# Patient Record
Sex: FEMALE | Race: WHITE | Employment: PART TIME | URBAN - METROPOLITAN AREA
[De-identification: names, ages, dates, MRNs, and addresses within clinical notes are randomized per-mention and may not be internally consistent; named-entity substitution may affect disease eponyms.]

---

## 2020-08-04 ENCOUNTER — HOSPITAL ENCOUNTER (OUTPATIENT)
Dept: GENERAL RADIOLOGY | Age: 20
Discharge: HOME | End: 2020-08-04
Payer: COMMERCIAL

## 2020-08-04 PROCEDURE — 71046 X-RAY EXAM CHEST 2 VIEWS: CPT

## 2021-05-19 ENCOUNTER — HOSPITAL ENCOUNTER (EMERGENCY)
Age: 21
Discharge: HOME | End: 2021-05-19
Attending: EMERGENCY MEDICINE
Payer: COMMERCIAL

## 2021-05-19 ENCOUNTER — APPOINTMENT (OUTPATIENT)
Dept: GENERAL RADIOLOGY | Age: 21
End: 2021-05-19
Payer: COMMERCIAL

## 2021-05-19 VITALS
HEIGHT: 67 IN | BODY MASS INDEX: 21.19 KG/M2 | RESPIRATION RATE: 16 BRPM | TEMPERATURE: 97.8 F | OXYGEN SATURATION: 100 % | WEIGHT: 135 LBS | HEART RATE: 69 BPM | DIASTOLIC BLOOD PRESSURE: 72 MMHG | SYSTOLIC BLOOD PRESSURE: 114 MMHG

## 2021-05-19 DIAGNOSIS — R06.02 SHORTNESS OF BREATH: Primary | ICD-10-CM

## 2021-05-19 PROCEDURE — 99283 EMERGENCY DEPT VISIT LOW MDM: CPT

## 2021-05-19 PROCEDURE — 71045 X-RAY EXAM CHEST 1 VIEW: CPT

## 2021-05-19 PROCEDURE — 93005 ELECTROCARDIOGRAM TRACING: CPT | Performed by: NURSE PRACTITIONER

## 2021-05-19 RX ORDER — IBUPROFEN 600 MG/1
600 TABLET ORAL EVERY 6 HOURS PRN
COMMUNITY

## 2021-05-19 RX ORDER — ACETAMINOPHEN 325 MG/1
650 TABLET ORAL EVERY 6 HOURS PRN
COMMUNITY

## 2021-05-19 RX ORDER — BETAMETHASONE DIPROPIONATE 0.05 %
OINTMENT (GRAM) TOPICAL 2 TIMES DAILY
COMMUNITY
Start: 2021-02-04

## 2021-05-19 RX ORDER — CETIRIZINE HYDROCHLORIDE 10 MG/1
10 TABLET ORAL DAILY
COMMUNITY
Start: 2020-09-03

## 2021-05-19 ASSESSMENT — ENCOUNTER SYMPTOMS
NAUSEA: 0
VOMITING: 0
SHORTNESS OF BREATH: 1
ABDOMINAL PAIN: 0
COUGH: 0
CHEST TIGHTNESS: 0

## 2021-05-19 ASSESSMENT — PAIN SCALES - GENERAL: PAINLEVEL_OUTOF10: 4

## 2021-05-19 ASSESSMENT — PAIN DESCRIPTION - PAIN TYPE: TYPE: ACUTE PAIN

## 2021-05-19 ASSESSMENT — PAIN DESCRIPTION - DESCRIPTORS: DESCRIPTORS: SHOOTING

## 2021-05-19 NOTE — ED PROVIDER NOTES
[address] [facility] ENCOUNTER          NURSE PRACTITIONER NOTE       Date of evaluation: 5/19/2021    Chief Complaint     Shortness of Breath      History of Present Illness     David Aguilera is a 24 y.o. female with a past medical history of anxiety and ongoing shortness of breath; who presents to the emergency department with a complaint of worsening of her shortness of breath today while she was sitting in her kitchen. Patient states that she was scrolling through her phone looking for recipes when she felt that her baseline shortness of breath got worse and that she \"cannot fill her lungs with air. \"  She feels like her lungs are \"restricted\" and are not feeling completely. Patient also relates an hour of intermittent sharp shooting chest pain, no chest pain currently. No radiation of the pain. Denies pleuritic component to the pain. Did recently travel via car to [place] (4-hour drive, with 1 stop). She has been evaluated by a pulmonologist in the past, and had reassuring CT scan of her chest in December 2020, as well as unremarkable PFTs. She states that this shortness of breath has been ongoing since May of last year. Getting over a URI/sinus infection for which she was treated with doxycycline, continues to have some postnasal drip with this. Otherwise denies acute complaints, denies cough, fevers chills or sweats. LMP one week ago. Review of Systems     Review of Systems   Constitutional: Negative. HENT: Positive for congestion. Respiratory: Positive for shortness of breath. Negative for cough and chest tightness. Cardiovascular: Positive for chest pain (intermittent, none currently). Negative for leg swelling. Gastrointestinal: Negative for abdominal pain, nausea and vomiting. Musculoskeletal: Negative. Skin: Negative. Allergic/Immunologic: Negative for immunocompromised state. Neurological: Negative.     Hematological: Does not bruise/bleed easily. Psychiatric/Behavioral: Negative. Past Medical, Surgical, Family, and Social History     She has a past medical history of Anxiety. She has no past surgical history on file. Her family history is not on file. She reports that she has never smoked. She has never used smokeless tobacco. She reports current alcohol use. She reports that she does not use drugs. Medications     Current Discharge Medication List      CONTINUE these medications which have NOT CHANGED    Details   acetaminophen (TYLENOL) 325 MG tablet Take 650 mg by mouth every 6 hours as needed for Pain      ibuprofen (ADVIL;MOTRIN) 600 MG tablet Take 600 mg by mouth every 6 hours as needed for Pain      cetirizine (ZYRTEC) 10 MG tablet Take 10 mg by mouth daily      betamethasone dipropionate (DIPROLENE) 0.05 % ointment Apply topically 2 times daily      NAPROXEN PO Take by mouth as needed             Allergies     She is allergic to augmentin [amoxicillin-pot clavulanate] and azithromycin. Physical Exam     INITIAL VITALS: BP: 127/82, Temp: 97.8 °F (36.6 °C), Pulse: 82, Resp: 18, SpO2: 100 %   Physical Exam  Vitals and nursing note reviewed. Constitutional:       Appearance: Normal appearance. She is well-developed and normal weight. Cardiovascular:      Rate and Rhythm: Normal rate and regular rhythm. Pulses: Normal pulses. Heart sounds: Normal heart sounds. No murmur heard. Comments: No extremity edema  Pulmonary:      Effort: Pulmonary effort is normal. No tachypnea, accessory muscle usage or respiratory distress. Breath sounds: Normal breath sounds. Skin:     General: Skin is warm and dry. Neurological:      Mental Status: She is alert and oriented to person, place, and time.    Psychiatric:         Mood and Affect: Mood normal.         Behavior: Behavior normal.           Diagnostic Results     EKG   Interpreted in conjunction with emergency department physician No att. providers found  Rhythm: outlined in the AVS. Patient was agreeable and understanding to this plan of care. This patient was also evaluated by the attending physician. All care plans were discussed and agreed upon. Clinical Impression     1.  Shortness of breath        Disposition     PATIENT REFERRED TO:  [facility] [address] [place] [address] [id number] [phone]      call for follow up appointment    Magdalena Brown MD  [address] [place] [address]  [phone]      call for follow up appointment      DISCHARGE MEDICATIONS:  Current Discharge Medication List          DISPOSITION  Home in stable condition        JACOB Cao - CNP  05/19/21 3663

## 2021-05-19 NOTE — ED TRIAGE NOTES
Patient reports to the ED with shortness of breath/difficulty taking a full breath x2 hours today. States she gets this often and has seen a pulmonologist that found nothing wrong. States they told her it was anxiety/stress. States she intermittently gets shooting pain in chest 4/10. Denies n/v/d. Denies chest pain at current. A&Ox4.

## 2021-05-20 LAB
EKG ATRIAL RATE: 71 BPM
EKG DIAGNOSIS: NORMAL
EKG P AXIS: 69 DEGREES
EKG P-R INTERVAL: 158 MS
EKG Q-T INTERVAL: 376 MS
EKG QRS DURATION: 80 MS
EKG QTC CALCULATION (BAZETT): 408 MS
EKG R AXIS: 80 DEGREES
EKG T AXIS: 72 DEGREES
EKG VENTRICULAR RATE: 71 BPM

## 2021-05-20 NOTE — ED NOTES
Discharge paperwork given to and reviewed with pt. Pt verbalized understanding and all questions answered. Pt encouraged to return if having worsening symptoms or new symptoms discussed in discharge paperwork. Pt to follow up with PCP   Pt in NAD, RR even and unlabored.  Pt off unit ambulatory with friend     Randell Gilmore RN  05/19/21 2100